# Patient Record
(demographics unavailable — no encounter records)

---

## 2025-04-17 NOTE — ASSESSMENT
[FreeTextEntry1] : GERD- doing well on famotidine 20 mg qhs.  Will continue this for now.  History of colon polyps - due for surveillance colonoscopy in 2031.

## 2025-04-17 NOTE — PHYSICAL EXAM
[Alert] : alert [Normal Voice/Communication] : normal voice/communication [Healthy Appearing] : healthy appearing [No Acute Distress] : no acute distress [Normal Appearance] : the appearance of the neck was normal [No Neck Mass] : no neck mass was observed [Respiration, Rhythm And Depth] : normal respiratory rhythm and effort [Auscultation Breath Sounds / Voice Sounds] : lungs were clear to auscultation bilaterally [Heart Rate And Rhythm] : heart rate was normal and rhythm regular [Normal S1, S2] : normal S1 and S2 [Murmurs] : no murmurs [Bowel Sounds] : normal bowel sounds [Abdomen Tenderness] : non-tender [No Masses] : no abdominal mass palpated [Abdomen Soft] : soft [] : no hepatosplenomegaly [No CVA Tenderness] : no CVA  tenderness [Normal Color / Pigmentation] : normal skin color and pigmentation [Oriented To Time, Place, And Person] : oriented to person, place, and time

## 2025-06-12 NOTE — PHYSICAL EXAM
[2+] : left foot dorsalis pedis 2+ [Varicose Veins Of Lower Extremities] : not present [FreeTextEntry3] : Temperature gradient within normal limits. CFT: 3 seconds x10. [de-identified] : Hammertoe deformity bilaterally, semi-rigid. Negative pain on ROM of the pedal joints. Muscle strength is 5/5. [FreeTextEntry1] : Epicritic sensation and light touch are intact.

## 2025-06-12 NOTE — PHYSICAL EXAM
[2+] : left foot dorsalis pedis 2+ [Varicose Veins Of Lower Extremities] : not present [FreeTextEntry3] : Temperature gradient within normal limits. CFT: 3 seconds x10. [de-identified] : Hammertoe deformity bilaterally, semi-rigid. Negative pain on ROM of the pedal joints. Muscle strength is 5/5. [FreeTextEntry1] : Epicritic sensation and light touch are intact.

## 2025-06-12 NOTE — HISTORY OF PRESENT ILLNESS
[FreeTextEntry1] : Patient presents today with complaint and discoloration in her left hallux nail and some discomfort on her right hallux nail. She denies any acute trauma. She does go for pedicures. She states that the nail had resolved in discoloration but has returned and she noticed it in May. She denies any trauma. she denies any drainage from the site of the left hallux. She states the right hallux nail started bothering her several weeks ago. It comes and goes. It is alleviated when the nail is taken care of but it has been bothering her more so especially in shoe gear over the past several weeks. She denies any drainage from the right hallux or any swelling or redness at the painful site.

## 2025-06-12 NOTE — PHYSICAL EXAM
[2+] : left foot dorsalis pedis 2+ [Varicose Veins Of Lower Extremities] : not present [FreeTextEntry3] : Temperature gradient within normal limits. CFT: 3 seconds x10. [de-identified] : Hammertoe deformity bilaterally, semi-rigid. Negative pain on ROM of the pedal joints. Muscle strength is 5/5. [FreeTextEntry1] : Epicritic sensation and light touch are intact.

## 2025-06-12 NOTE — ASSESSMENT
[FreeTextEntry1] : Impression: Onychomycosis left hallux. Ingrown nail of the right hallux.  Treatment: Discussed findings and conditions with the patient. The right hallux nail was prepped. The offending nail border was removed via distal slant-back without incident and the small IPK noted in the medial nail fold was trimmed and removed with a sterile #15 blade without incidence, cleansed with normal saline and antibiotic ointment was applied to the area. in regards to the left hallux, she started soaking the toe in white vinegar and water.  She is to continue with the soaks. Patient has Jublia at home and had multiple refills. The Jublia will be delivered to her in a day or 2 and she is to start application of the Jublia with instructions again with application once daily around the nail fold and in the lysed area. She is to discontinue if there is any redness, blistering, problems or concerns, patient is to return in approximately 2 1/2 to 3 months. If there is any increased in redness, problems, concerns or discoloration she is to contact the office for an earlier appointment. She was advised to avoid pedicures. If she needed polish changes, she could apply polish to the area, a light polish to the area and remove regularly and reapply. If there is any issues, problems, or concerns, she is to contact the office for an earlier appointment.

## 2025-07-29 NOTE — CONSULT LETTER
[Dear  ___] : Dear  [unfilled], [Consult Letter:] : I had the pleasure of evaluating your patient, [unfilled]. [Please see my note below.] : Please see my note below. [Consult Closing:] : Thank you very much for allowing me to participate in the care of this patient.  If you have any questions, please do not hesitate to contact me. [Sincerely,] : Sincerely, [FreeTextEntry2] : Tyler Rodrigues MD  [FreeTextEntry3] : Quentin Real MD\par  Surgical Oncology\par  [facility]\par  Office: [phone]\par  Cell: [phone]\par   [DrDior  ___] : Dr. SHEETS [DrDior ___] : Dr. SHEETS

## 2025-07-29 NOTE — PHYSICAL EXAM
[FreeTextEntry1] : SS present during physical exam.  [Normal] : supple, no neck mass and thyroid not enlarged [Normal Supraclavicular Lymph Nodes] : normal supraclavicular lymph nodes [Normal Axillary Lymph Nodes] : normal axillary lymph nodes [Normal] : oriented to person, place and time, with appropriate affect [de-identified] : Well-healed left breast scar with no evidence of recurrent disease, no dominant mass or nipple discharge bilaterally.

## 2025-07-29 NOTE — CONSULT LETTER
[Dear  ___] : Dear  [unfilled], [Consult Letter:] : I had the pleasure of evaluating your patient, [unfilled]. [Please see my note below.] : Please see my note below. [Consult Closing:] : Thank you very much for allowing me to participate in the care of this patient.  If you have any questions, please do not hesitate to contact me. [Sincerely,] : Sincerely, [FreeTextEntry2] : Tyler Rodrigues MD  [FreeTextEntry3] : Quentin Real MD\par  Surgical Oncology\par  [facility]\par  Office: [phone]\par  Cell: [phone]\par   [DrDior  ___] : Dr. SHEETS [DrDior ___] : Dr. SHEETS

## 2025-07-29 NOTE — HISTORY OF PRESENT ILLNESS
[de-identified] : Liv is a 74 year-old female here for ongoing follow-up.   She had a left lumpectomy and axillary lymph node dissection in 1993 for invasive breast cancer (w/ my former partner Dr. Dileep Kruger). She received postoperative chemotherapy, radiation and has completed a course of tamoxifen. She remains under the care of Dr. Singh of medical oncology.  Breast imaging of a Breast MRI June 2021 with no MRI evidence of breast malignancy or recurrent disease, BI-RADS 2.  She underwent mammogram and sonogram on 11/23/21, with BI-RADS 2 findings.   Liv underwent a breast MRI on 6/23/2022, BIRADS 2.  Liv denies interval changes or health.    Bilateral mammo/sono from 11/29/22 with benign findings, BI-RADS 2 DEXA scan also from 11/29/22 shows osteopenia that requires no treatment at this time and to repeat in 2 years.   She remains with a family history of breast cancer involving her mother and maternal grandmother. She also has had 2 great aunts with breast cancer. She has had genetic testing and is negative   12/1/22- Liv denies any health changes since our last visit, she denies palpable breast masses, nipple discharge, skin changes, inversion or breast pain. She will follow-up with me in 6 months upon completion of her breast MRI in June 2023  Breast MRI 6/27/2023 showed benign findings, BI-RADS 2   7/14/2023: Liv is doing well.  She denies any breast symptoms.  She continues for ongoing follow-up.  No interval changes to her health. Liv will undergo repeat mammogram and sonogram in November 2023. She will follow-up with us shortly thereafter.  B/L mammo/sono 11/2023 with benign findings, BI-RADS 2.  1/18/2024 - Liv continues to do well, she continues to follow with Dr. Singh from medical oncology. She denies palpable breast masses, nipple discharge, skin changes, inversion or breast pain. Liv will follow-up with us in 6 months after her breast MRI in June 2024.   MRI 7/16/2024 with benign findings, BI-RADS 2.  7/18/2024 - Liv returns for ongoing follow-up. She denies palpable breast masses, nipple discharge, skin changes, inversion or breast pain. Liv will undergo her annual mammo/sono in November and follow-up shortly after.   B/L mammo/sono 11/2024- BIRADS 2   1/28/2025 - Liv is here for routine follow-up, she is feeling well overall and denies any new or worrisome health issues. She denies palpable breast masses, nipple discharge, skin changes, inversion or breast pain. She reports her daughter was diagnosed w/ breast cancer last year but had surgery & chemo and is doing well overall. Both Liv & her daughter are BRCA negative.  Liv continues to do well, she will follow-up in 6 months after her breast MRI.   Breast MRI 7/2025 -

## 2025-07-29 NOTE — HISTORY OF PRESENT ILLNESS
[de-identified] : Liv is a 74 year-old female here for ongoing follow-up.   She had a left lumpectomy and axillary lymph node dissection in 1993 for invasive breast cancer (w/ my former partner Dr. Dileep Kruger). She received postoperative chemotherapy, radiation and has completed a course of tamoxifen. She remains under the care of Dr. Singh of medical oncology.  Breast imaging of a Breast MRI June 2021 with no MRI evidence of breast malignancy or recurrent disease, BI-RADS 2.  She underwent mammogram and sonogram on 11/23/21, with BI-RADS 2 findings.   Liv underwent a breast MRI on 6/23/2022, BIRADS 2.  Liv denies interval changes or health.    Bilateral mammo/sono from 11/29/22 with benign findings, BI-RADS 2 DEXA scan also from 11/29/22 shows osteopenia that requires no treatment at this time and to repeat in 2 years.   She remains with a family history of breast cancer involving her mother and maternal grandmother. She also has had 2 great aunts with breast cancer. She has had genetic testing and is negative   12/1/22- Liv denies any health changes since our last visit, she denies palpable breast masses, nipple discharge, skin changes, inversion or breast pain. She will follow-up with me in 6 months upon completion of her breast MRI in June 2023  Breast MRI 6/27/2023 showed benign findings, BI-RADS 2   7/14/2023: Liv is doing well.  She denies any breast symptoms.  She continues for ongoing follow-up.  No interval changes to her health. Liv will undergo repeat mammogram and sonogram in November 2023. She will follow-up with us shortly thereafter.  B/L mammo/sono 11/2023 with benign findings, BI-RADS 2.  1/18/2024 - Liv continues to do well, she continues to follow with Dr. Singh from medical oncology. She denies palpable breast masses, nipple discharge, skin changes, inversion or breast pain. Liv will follow-up with us in 6 months after her breast MRI in June 2024.   MRI 7/16/2024 with benign findings, BI-RADS 2.  7/18/2024 - Liv returns for ongoing follow-up. She denies palpable breast masses, nipple discharge, skin changes, inversion or breast pain. Liv will undergo her annual mammo/sono in November and follow-up shortly after.   B/L mammo/sono 11/2024- BIRADS 2   1/28/2025 - Liv is here for routine follow-up, she is feeling well overall and denies any new or worrisome health issues. She denies palpable breast masses, nipple discharge, skin changes, inversion or breast pain. She reports her daughter was diagnosed w/ breast cancer last year but had surgery & chemo and is doing well overall. Both Liv & her daughter are BRCA negative.  Liv continues to do well, she will follow-up in 6 months after her breast MRI.   Breast MRI 7/2025 -

## 2025-07-29 NOTE — ASSESSMENT
[FreeTextEntry1] :   All medical entries were at my, Dr. Quentin Real, direction. I have reviewed the chart and agree that the record accurately reflects my personal performance of the history, physical exam, assessment, and plan.  Our office nurse practitioner was present for the duration of the office visit.

## 2025-07-29 NOTE — PHYSICAL EXAM
[FreeTextEntry1] : SS present during physical exam.  [Normal] : supple, no neck mass and thyroid not enlarged [Normal Supraclavicular Lymph Nodes] : normal supraclavicular lymph nodes [Normal Axillary Lymph Nodes] : normal axillary lymph nodes [Normal] : oriented to person, place and time, with appropriate affect [de-identified] : Well-healed left breast scar with no evidence of recurrent disease, no dominant mass or nipple discharge bilaterally.